# Patient Record
Sex: FEMALE | Race: WHITE | Employment: FULL TIME | URBAN - METROPOLITAN AREA
[De-identification: names, ages, dates, MRNs, and addresses within clinical notes are randomized per-mention and may not be internally consistent; named-entity substitution may affect disease eponyms.]

---

## 2023-01-23 ENCOUNTER — APPOINTMENT (OUTPATIENT)
Dept: GENERAL RADIOLOGY | Age: 43
End: 2023-01-23
Payer: COMMERCIAL

## 2023-01-23 ENCOUNTER — HOSPITAL ENCOUNTER (EMERGENCY)
Age: 43
Discharge: HOME | End: 2023-01-23
Attending: EMERGENCY MEDICINE
Payer: COMMERCIAL

## 2023-01-23 VITALS
WEIGHT: 156.56 LBS | TEMPERATURE: 97.6 F | DIASTOLIC BLOOD PRESSURE: 66 MMHG | HEART RATE: 84 BPM | BODY MASS INDEX: 29.56 KG/M2 | HEIGHT: 61 IN | RESPIRATION RATE: 14 BRPM | OXYGEN SATURATION: 100 % | SYSTOLIC BLOOD PRESSURE: 126 MMHG

## 2023-01-23 DIAGNOSIS — M79.602 LEFT ARM PAIN: ICD-10-CM

## 2023-01-23 DIAGNOSIS — R00.2 PALPITATIONS: Primary | ICD-10-CM

## 2023-01-23 LAB
A/G RATIO: 1.3 (ref 1.1–2.2)
ALBUMIN SERPL-MCNC: 4.8 G/DL (ref 3.4–5)
ALP BLD-CCNC: 88 U/L (ref 40–129)
ALT SERPL-CCNC: 16 U/L (ref 10–40)
ANION GAP SERPL CALCULATED.3IONS-SCNC: 14 MMOL/L (ref 3–16)
AST SERPL-CCNC: 19 U/L (ref 15–37)
BASOPHILS ABSOLUTE: 0 K/UL (ref 0–0.2)
BASOPHILS RELATIVE PERCENT: 0.7 %
BILIRUB SERPL-MCNC: 0.3 MG/DL (ref 0–1)
BUN BLDV-MCNC: 11 MG/DL (ref 7–20)
CALCIUM SERPL-MCNC: 10.1 MG/DL (ref 8.3–10.6)
CHLORIDE BLD-SCNC: 104 MMOL/L (ref 99–110)
CO2: 21 MMOL/L (ref 21–32)
CREAT SERPL-MCNC: 0.8 MG/DL (ref 0.6–1.1)
D DIMER: 0.31 UG/ML FEU (ref 0–0.6)
EOSINOPHILS ABSOLUTE: 0.1 K/UL (ref 0–0.6)
EOSINOPHILS RELATIVE PERCENT: 2.6 %
GFR SERPL CREATININE-BSD FRML MDRD: >60 ML/MIN/{1.73_M2}
GLUCOSE BLD-MCNC: 96 MG/DL (ref 70–99)
HCT VFR BLD CALC: 42.9 % (ref 36–48)
HEMOGLOBIN: 14.8 G/DL (ref 12–16)
LACTIC ACID: 1.4 MMOL/L (ref 0.4–2)
LYMPHOCYTES ABSOLUTE: 1.7 K/UL (ref 1–5.1)
LYMPHOCYTES RELATIVE PERCENT: 34.9 %
MAGNESIUM: 1.9 MG/DL (ref 1.8–2.4)
MCH RBC QN AUTO: 28.9 PG (ref 26–34)
MCHC RBC AUTO-ENTMCNC: 34.4 G/DL (ref 31–36)
MCV RBC AUTO: 84.1 FL (ref 80–100)
MONOCYTES ABSOLUTE: 0.3 K/UL (ref 0–1.3)
MONOCYTES RELATIVE PERCENT: 5.9 %
NEUTROPHILS ABSOLUTE: 2.8 K/UL (ref 1.7–7.7)
NEUTROPHILS RELATIVE PERCENT: 55.9 %
PDW BLD-RTO: 12.8 % (ref 12.4–15.4)
PLATELET # BLD: 265 K/UL (ref 135–450)
PMV BLD AUTO: 8.8 FL (ref 5–10.5)
POTASSIUM SERPL-SCNC: 4.2 MMOL/L (ref 3.5–5.1)
PRO-BNP: 74 PG/ML (ref 0–124)
RBC # BLD: 5.11 M/UL (ref 4–5.2)
SODIUM BLD-SCNC: 139 MMOL/L (ref 136–145)
TOTAL PROTEIN: 8.6 G/DL (ref 6.4–8.2)
TROPONIN: <0.01 NG/ML
TSH REFLEX: 1.9 UIU/ML (ref 0.27–4.2)
WBC # BLD: 5 K/UL (ref 4–11)

## 2023-01-23 PROCEDURE — 71045 X-RAY EXAM CHEST 1 VIEW: CPT

## 2023-01-23 PROCEDURE — 83735 ASSAY OF MAGNESIUM: CPT

## 2023-01-23 PROCEDURE — 83605 ASSAY OF LACTIC ACID: CPT

## 2023-01-23 PROCEDURE — 80053 COMPREHEN METABOLIC PANEL: CPT

## 2023-01-23 PROCEDURE — 93005 ELECTROCARDIOGRAM TRACING: CPT | Performed by: EMERGENCY MEDICINE

## 2023-01-23 PROCEDURE — 83880 ASSAY OF NATRIURETIC PEPTIDE: CPT

## 2023-01-23 PROCEDURE — 84484 ASSAY OF TROPONIN QUANT: CPT

## 2023-01-23 PROCEDURE — 84443 ASSAY THYROID STIM HORMONE: CPT

## 2023-01-23 PROCEDURE — 85025 COMPLETE CBC W/AUTO DIFF WBC: CPT

## 2023-01-23 PROCEDURE — 85379 FIBRIN DEGRADATION QUANT: CPT

## 2023-01-23 PROCEDURE — 99285 EMERGENCY DEPT VISIT HI MDM: CPT

## 2023-01-23 RX ORDER — BUPROPION HYDROCHLORIDE 150 MG/1
150 TABLET ORAL DAILY
COMMUNITY
Start: 2022-11-01

## 2023-01-23 RX ORDER — 0.9 % SODIUM CHLORIDE 0.9 %
1000 INTRAVENOUS SOLUTION INTRAVENOUS ONCE
Status: DISCONTINUED | OUTPATIENT
Start: 2023-01-23 | End: 2023-01-23 | Stop reason: HOSPADM

## 2023-01-23 RX ORDER — MONTELUKAST SODIUM 10 MG/1
TABLET ORAL
COMMUNITY
Start: 2022-11-01

## 2023-01-23 ASSESSMENT — ENCOUNTER SYMPTOMS
BACK PAIN: 0
VOMITING: 0
SINUS PAIN: 0
DIARRHEA: 0
SORE THROAT: 0
SHORTNESS OF BREATH: 0
CHEST TIGHTNESS: 0
COUGH: 0
ABDOMINAL PAIN: 0
COLOR CHANGE: 0

## 2023-01-23 ASSESSMENT — PAIN - FUNCTIONAL ASSESSMENT: PAIN_FUNCTIONAL_ASSESSMENT: NONE - DENIES PAIN

## 2023-01-23 NOTE — DISCHARGE INSTRUCTIONS
Take Tylenol or ibuprofen as needed for pain. Drink plenty of fluids if you have palpitations to see if this helps. Return to the emergency department for any worsening palpitations associated with feel like you cannot pass out, new onset chest pain or shortness of breath, numbness or weakness on one side of the body, fever, or any other concerns. Otherwise, follow-up with your primary doctor to see about urgent outpatient evaluation and Holter monitor/echocardiogram.  You also should follow-up with cardiology based on history of mitral valve prolapse to be safe. Call to make an appointment. Check with your primary doctor about your thyroid result.

## 2023-01-23 NOTE — ED PROVIDER NOTES
[address] [address] ENCOUNTER        Pt Name: Dc Qureshi  MRN: [id number]  [place] 1980  Date of evaluation: 1/23/2023  Provider: Gisell Lindsey MD  PCP: No primary care provider on file. CHIEF COMPLAINT       Chief Complaint   Patient presents with    Tachycardia         HISTORY OFPRESENT ILLNESS   (Location/Symptom, Timing/Onset, Context/Setting, Quality, Duration, Modifying Factors,Severity)  Note limiting factors. Dc Qureshi is a 38 y.o. female presenting today due to concern for feeling her heart racing this morning when she woke up which is abnormal for her. She woke up around 8 AM and also noticed some left arm pain at that point but the left arm pain is waxing and waning and is currently gone. She noticed some tingling in both fingertips earlier today but states that is gone. She does have a slight headache but states that is chronic. She denies any leg swelling or history of blood clots. She does have a prior history of mitral valve prolapse. She denies any abdominal pain or vomiting. No diarrhea. She felt fine when she went to bed last night. She denies any reason to be more stressed or anxious recently. She has irregular periods and that has been going on for the last year with the last one being 37 days ago. She feels safe at home. She denies any fever. No urinary complaints. She is on Wellbutrin but denies any recent medication changes. Due to concern for rapid heart rate and left arm pain, she came to the ED for further assessment. She does report feeling more short of breath over the past couple of weeks but denies feeling short of breath currently. REVIEW OF SYSTEMS    (2-9 systems for level 4, 10 or more for level 5)     Review of Systems   Constitutional:  Negative for chills, diaphoresis, fatigue and fever. HENT:  Negative for congestion, sinus pain and sore throat.     Respiratory:  Negative for cough, chest tightness and shortness of breath (not today, but intermittent for the last 2 weeks). Cardiovascular:  Positive for palpitations. Negative for chest pain (no pain with breathing) and leg swelling. Gastrointestinal:  Negative for abdominal pain, diarrhea and vomiting. Genitourinary:  Negative for difficulty urinating, dysuria and flank pain. Musculoskeletal:  Negative for back pain and neck pain. Skin:  Negative for color change and wound. Neurological:  Positive for headaches (mild, similar to prior headaches). Negative for dizziness, syncope, weakness, light-headedness and numbness (fingertips earlier today, gone now). Psychiatric/Behavioral:  Negative for confusion. The patient is not nervous/anxious. Positives and Pertinent negatives as per HPI. PASTMEDICAL HISTORY     Past Medical History:   Diagnosis Date    Asthma     Depression          SURGICAL HISTORY     No past surgical history on file. CURRENT MEDICATIONS       Discharge Medication List as of 1/23/2023  3:27 PM        CONTINUE these medications which have NOT CHANGED    Details   buPROPion (WELLBUTRIN XL) 150 MG extended release tablet Take 150 mg by mouth dailyHistorical Med      montelukast (SINGULAIR) 10 MG tablet Historical Med             ALLERGIES     Patient has no known allergies. FAMILY HISTORY     No family history on file.        SOCIAL HISTORY       Social History     Socioeconomic History    Marital status: Single   Tobacco Use    Smoking status: Never    Smokeless tobacco: Never   Substance and Sexual Activity    Alcohol use: Yes     Comment: social    Drug use: Never       SCREENINGS    [place] Coma Scale  Eye Opening: Spontaneous  Best Verbal Response: Oriented  Best Motor Response: Obeys commands  Octavio Coma Scale Score: 15           PHYSICAL EXAM    (up to 7 for level 4, 8 or more for level 5)     ED Triage Vitals [01/23/23 1148]   BP Temp Temp Source Heart Rate Resp SpO2 Height Weight   (!) 143/92 98.4 °F (36.9 °C) Oral (!) 118 20 100 % 5' 1\" (1.549 m) 156 lb 9 oz (71 kg)       Physical Exam  Vitals and nursing note reviewed. Constitutional:       General: She is awake. She is not in acute distress. Appearance: Normal appearance. She is well-developed, well-groomed and overweight. She is not ill-appearing, toxic-appearing or diaphoretic. Interventions: She is not intubated. HENT:      Head: Normocephalic and atraumatic. Right Ear: External ear normal.      Left Ear: External ear normal.      Nose: Nose normal.      Mouth/Throat:      Mouth: Mucous membranes are moist.   Eyes:      General:         Right eye: No discharge. Left eye: No discharge. Neck:      Trachea: No tracheal deviation. Cardiovascular:      Rate and Rhythm: Regular rhythm. Tachycardia present. Pulses: Normal pulses. Pulmonary:      Effort: Pulmonary effort is normal. No tachypnea, bradypnea, accessory muscle usage, prolonged expiration, respiratory distress or retractions. She is not intubated. Breath sounds: Normal breath sounds and air entry. No stridor. No decreased breath sounds, wheezing, rhonchi or rales. Chest:      Chest wall: No tenderness. Abdominal:      General: Abdomen is flat. Bowel sounds are normal. There is no distension. Palpations: Abdomen is soft. Abdomen is not rigid. Tenderness: There is no abdominal tenderness. There is no guarding or rebound. Negative signs include Reese's sign and McBurney's sign. Musculoskeletal:         General: No swelling, tenderness, deformity or signs of injury. Normal range of motion. Cervical back: Full passive range of motion without pain, normal range of motion and neck supple. No edema, erythema, rigidity or tenderness. Normal range of motion. Right lower leg: No edema. Left lower leg: No edema. Skin:     General: Skin is warm and dry. Coloration: Skin is not jaundiced or pale.       Findings: No erythema or rash. Neurological:      General: No focal deficit present. Mental Status: She is alert and oriented to person, place, and time. Mental status is at baseline. GCS: GCS eye subscore is 4. GCS verbal subscore is 5. GCS motor subscore is 6. Sensory: No sensory deficit. Motor: No weakness, tremor, atrophy, abnormal muscle tone or seizure activity. Psychiatric:         Mood and Affect: Mood normal.         Behavior: Behavior normal. Behavior is cooperative. DIAGNOSTIC RESULTS   :    Labs Reviewed   COMPREHENSIVE METABOLIC PANEL - Abnormal; Notable for the following components:       Result Value    Total Protein 8.6 (*)     All other components within normal limits   CBC WITH AUTO DIFFERENTIAL   TROPONIN   BRAIN NATRIURETIC PEPTIDE   D-DIMER, QUANTITATIVE   MAGNESIUM   LACTIC ACID   TSH WITH REFLEX       All other labs were within normal range or not returned asof this dictation. EKG: All EKG's are interpreted by the Emergency Department Physician who either signs or Co-signs this chart in the absence of a cardiologist.    The Ekg interpreted by me shows  sinus tachycardia, ndlr=[id number]    Axis is   Normal  QTc is  normal  Intervals and Durations are unremarkable. ST Segments: nonspecific changes  No significant change from prior EKG dated - no old EKG   No STEMI         RADIOLOGY:   Non-plain film images such as CT, Ultrasound and MRI are read by the radiologist. Lamar Jere images are visualized and preliminarily interpreted by the  ED Provider with the belowfindings:        Interpretation per the Radiologist below, if available at the time of this note:    XR CHEST PORTABLE   Final Result   1. No acute disease.             PROCEDURES   Unless otherwise noted below, none     Procedures    CRITICAL CARE TIME   Critical Care Time: N/A  0 minutes           CONSULTS:  None    EMERGENCY DEPARTMENT COURSE and DIFFERENTIAL DIAGNOSIS/MDM:   Vitals:    Vitals:    01/23/23 1148 01/23/23 1450   BP: (!) 143/92 126/66   Pulse: (!) 118 84   Resp: 20 14   Temp: 98.4 °F (36.9 °C) 97.6 °F (36.4 °C)   TempSrc: Oral Oral   SpO2: 100% 100%   Weight: 156 lb 9 oz (71 kg)    Height: 5' 1\" (1.549 m)        Patient was given the following medications:  Medications - No data to display    Patient was evaluated due to having palpitations this morning when she woke up at 8 AM with some left-sided intermittent arm pain. She currently denies any pain but still feels like her heart is racing. She was tachycardic on arrival.  I am considering pulmonary embolism, pneumonia, anxiety, sepsis, dehydration, amongst other pathology. We will start with IV fluids to see if this helps with the tachycardia. D-dimer was ordered to assess for possibility of pulmonary embolism although she denies any pleuritic chest pain. She ultimately did not want the IV placed but was okay with the blood draw initially. She did agree to oral fluids and therefore did drink a lot of water. Her tachycardia did improve in the ED. D-dimer was negative and story not suggestive of pulmonary embolism. Troponin was negative and story not concerning for acute coronary syndrome. TSH was negative. Upon repeat assessment, she was feeling much better and no longer had any palpitation concern. She is aware that if she goes home and has any return of palpitations associated with chest pain or shortness of breath, coughing up blood, fever, or any other concerns, then return to the ED immediately, but otherwise follow-up with primary doctor or cardiology over the next 1 to 2 weeks for further assessment and see about outpatient Holter monitor based on her history of MVP. She was well-appearing and in no acute distress and ambulatory without difficulty at time of discharge and felt comfortable with this plan.   I did consider admission under observation but feel like at this point the risk of hospitalization outweighs the benefit and she felt comfortable going home at this time. I was the primary provider for the patient. The patient tolerated their visit well. The patient and / or the family were informed of the results of any tests, a time was given to answer questions. FINAL IMPRESSION      1. Palpitations    2.  Left arm pain          DISPOSITION/PLAN   DISPOSITION Decision To Discharge 01/23/2023 03:20:50 PM-Discharged in improved, stable condition      PATIENT REFERRED TO:  [address] Emergency Department  [address] [id number] [phone]  Go to   If symptoms worsen    [facility]  [address]  [phone]    In 3 days  see your primary doctor in 3-5 days for repeat evaluation    Blanca Darnell MD  [address]  [phone]    In 1 week  For further evaluation related to your palpitations and see about Holter Monitor and echocardiogram    DISCHARGEMEDICATIONS:  Discharge Medication List as of 1/23/2023  3:27 PM          DISCONTINUED MEDICATIONS:  Discharge Medication List as of 1/23/2023  3:27 PM                 (Please note that portions of this note were completed with a voicerecognition program.  Efforts were made to edit the dictations but occasionally words are mis-transcribed.)    River Osman MD (electronically signed)            River Osman MD  01/25/23 3647

## 2023-01-23 NOTE — ED NOTES
Patient given d/c instructions with return verbalization . Emphasis on f/u with PCP and cardiology. Pt to return with worsening s/s. Work note given, declined use of WC. Pt ambulated to lobby with steady gait.      Chuy Barfield RN  01/23/23 [id number]

## 2023-01-23 NOTE — Clinical Note
Michael Dinh was seen and treated in our emergency department on 1/23/2023. She may return to work on 01/25/2023. May return sooner if feeling better     If you have any questions or concerns, please don't hesitate to call.       Bela Cruz MD

## 2023-01-24 LAB
EKG ATRIAL RATE: 127 BPM
EKG DIAGNOSIS: NORMAL
EKG P AXIS: 52 DEGREES
EKG P-R INTERVAL: 136 MS
EKG Q-T INTERVAL: 306 MS
EKG QRS DURATION: 78 MS
EKG QTC CALCULATION (BAZETT): 444 MS
EKG R AXIS: 46 DEGREES
EKG T AXIS: 54 DEGREES
EKG VENTRICULAR RATE: 127 BPM

## 2023-01-24 PROCEDURE — 93010 ELECTROCARDIOGRAM REPORT: CPT | Performed by: INTERNAL MEDICINE

## 2023-01-31 ENCOUNTER — TELEPHONE (OUTPATIENT)
Dept: CARDIOLOGY CLINIC | Age: 43
End: 2023-01-31

## 2023-01-31 NOTE — TELEPHONE ENCOUNTER
Called patient to schedule cardiology evaluation. Patient states that she is following up with her PCP. Cardiology appointment not necessary at this time.